# Patient Record
Sex: MALE | Race: AMERICAN INDIAN OR ALASKA NATIVE
[De-identification: names, ages, dates, MRNs, and addresses within clinical notes are randomized per-mention and may not be internally consistent; named-entity substitution may affect disease eponyms.]

---

## 2020-01-02 ENCOUNTER — HOSPITAL ENCOUNTER (EMERGENCY)
Dept: HOSPITAL 5 - ED | Age: 30
Discharge: HOME | End: 2020-01-02
Payer: SELF-PAY

## 2020-01-02 VITALS — DIASTOLIC BLOOD PRESSURE: 66 MMHG | SYSTOLIC BLOOD PRESSURE: 104 MMHG

## 2020-01-02 DIAGNOSIS — R10.31: Primary | ICD-10-CM

## 2020-01-02 DIAGNOSIS — F12.188: ICD-10-CM

## 2020-01-02 DIAGNOSIS — R10.33: ICD-10-CM

## 2020-01-02 LAB
ALBUMIN SERPL-MCNC: 5 G/DL (ref 3.9–5)
ALT SERPL-CCNC: 32 UNITS/L (ref 7–56)
BACTERIA #/AREA URNS HPF: (no result) /HPF
BASOPHILS # (AUTO): 0.1 K/MM3 (ref 0–0.1)
BASOPHILS NFR BLD AUTO: 0.5 % (ref 0–1.8)
BILIRUB UR QL STRIP: (no result)
BLOOD UR QL VISUAL: (no result)
BUN SERPL-MCNC: 32 MG/DL (ref 9–20)
BUN/CREAT SERPL: 23 %
CALCIUM SERPL-MCNC: 11.8 MG/DL (ref 8.4–10.2)
EOSINOPHIL # BLD AUTO: 0 K/MM3 (ref 0–0.4)
EOSINOPHIL NFR BLD AUTO: 0 % (ref 0–4.3)
HCT VFR BLD CALC: 50.2 % (ref 35.5–45.6)
HEMOLYSIS INDEX: 12
HGB BLD-MCNC: 17.2 GM/DL (ref 11.8–15.2)
LYMPHOCYTES # BLD AUTO: 2.1 K/MM3 (ref 1.2–5.4)
LYMPHOCYTES NFR BLD AUTO: 16.9 % (ref 13.4–35)
MCHC RBC AUTO-ENTMCNC: 34 % (ref 32–34)
MCV RBC AUTO: 95 FL (ref 84–94)
MONOCYTES # (AUTO): 1.5 K/MM3 (ref 0–0.8)
MONOCYTES % (AUTO): 12.4 % (ref 0–7.3)
MUCOUS THREADS #/AREA URNS HPF: (no result) /HPF
PH UR STRIP: 5 [PH] (ref 5–7)
PLATELET # BLD: 234 K/MM3 (ref 140–440)
RBC # BLD AUTO: 5.3 M/MM3 (ref 3.65–5.03)
RBC #/AREA URNS HPF: 81 /HPF (ref 0–6)
UROBILINOGEN UR-MCNC: < 2 MG/DL (ref ?–2)
WBC #/AREA URNS HPF: 29 /HPF (ref 0–6)

## 2020-01-02 PROCEDURE — 81001 URINALYSIS AUTO W/SCOPE: CPT

## 2020-01-02 PROCEDURE — 85025 COMPLETE CBC W/AUTO DIFF WBC: CPT

## 2020-01-02 PROCEDURE — 74177 CT ABD & PELVIS W/CONTRAST: CPT

## 2020-01-02 PROCEDURE — 36415 COLL VENOUS BLD VENIPUNCTURE: CPT

## 2020-01-02 PROCEDURE — 87086 URINE CULTURE/COLONY COUNT: CPT

## 2020-01-02 PROCEDURE — 96376 TX/PRO/DX INJ SAME DRUG ADON: CPT

## 2020-01-02 PROCEDURE — 99284 EMERGENCY DEPT VISIT MOD MDM: CPT

## 2020-01-02 PROCEDURE — 96375 TX/PRO/DX INJ NEW DRUG ADDON: CPT

## 2020-01-02 PROCEDURE — 80053 COMPREHEN METABOLIC PANEL: CPT

## 2020-01-02 PROCEDURE — 96365 THER/PROPH/DIAG IV INF INIT: CPT

## 2020-01-02 NOTE — EMERGENCY DEPARTMENT REPORT
<DARREN COTTRELL - Last Filed: 20 07:08>





ED Abdominal Pain HPI





- General


Chief Complaint: Abdominal Pain


Stated Complaint: ABD PAIN


Source: patient


Mode of arrival: Ambulatory


Limitations: No Limitations





- History of Present Illness


Initial Comments: 





Patient is a 29-year-old  male with no past medical history who 

presents to the ED with acute onset persistent severe abdominal pain in the 

right lower quadrant area radiating to the periumbilical area with intermittent 

nausea and vomiting for the last 3 days, worse in the last 12 hours.  Patient 

states that the pain is constant and sharp and persistent and that he is unable 

to sleep or sit still because of severe pain.  Patient denies fever, chills, 

dizziness, syncope, testicle pain, dysuria, urinary frequency and urgency, 

headache, chest pain or shortness of breath, hematuria or headache.


MD Complaint: abdominal pain, other (nausea and vomiting)


-: Sudden, days(s) (3)


Location: periumbilical, RLQ


Radiation: RLQ


Severity: severe


Severity scale (0 -10): 10


Quality: cramping, aching, sharp


Consistency: constant


Improves With: nothing


Worsens With: nothing


Associated Symptoms: denies other symptoms, nausea, vomiting.  denies: diarrhea,

fever, chills, constipation, dysuria, hematemesis, hematochezia, melena, 

hematuria, anorexia, syncope





- Related Data


                                  Previous Rx's











 Medication  Instructions  Recorded  Last Taken  Type


 


Ondansetron [Zofran Odt] 4 mg PO Q8HR PRN #12 tab.rapdis 20 Unknown Rx











                                    Allergies











Allergy/AdvReac Type Severity Reaction Status Date / Time


 


No Known Allergies Allergy   Unverified 20 00:54














ED Review of Systems


Constitutional: denies: chills, fever


Eyes: denies: eye pain, eye discharge, vision change


ENT: denies: ear pain, throat pain


Respiratory: denies: cough, shortness of breath, wheezing


Cardiovascular: denies: chest pain, palpitations


Endocrine: no symptoms reported


Gastrointestinal: abdominal pain, nausea, vomiting.  denies: diarrhea


Genitourinary: denies: urgency, dysuria


Musculoskeletal: denies: back pain, joint swelling, arthralgia


Skin: denies: rash, lesions


Neurological: denies: headache, weakness, paresthesias


Psychiatric: denies: anxiety, depression


Hematological/Lymphatic: denies: easy bleeding, easy bruising





ED Past Medical Hx





- Past Medical History


Previous Medical History?: No





- Surgical History


Past Surgical History?: No





- Social History


Smoking Status: Never Smoker


Substance Use Type: Alcohol, Marijuana





- Medications


Home Medications: 


                                Home Medications











 Medication  Instructions  Recorded  Confirmed  Last Taken  Type


 


Ondansetron [Zofran Odt] 4 mg PO Q8HR PRN #12 tab.rapdis 20  Unknown Rx














ED Physical Exam





- General


Limitations: No Limitations


General appearance: alert, in no apparent distress





- Head


Head exam: Present: atraumatic, normocephalic, normal inspection





- Eye


Eye exam: Present: normal appearance, PERRL, EOMI


Pupils: Present: normal accommodation





- ENT


ENT exam: Present: normal exam, normal orophraynx, mucous membranes moist, TM's 

normal bilaterally, normal external ear exam





- Neck


Neck exam: Present: normal inspection, full ROM.  Absent: tenderness





- Respiratory


Respiratory exam: Present: normal lung sounds bilaterally.  Absent: respiratory 

distress, wheezes, rales, rhonchi, chest wall tenderness, accessory muscle use, 

decreased breath sounds





- Cardiovascular


Cardiovascular Exam: Present: normal rhythm, tachycardia, normal heart sounds.  

Absent: systolic murmur, diastolic murmur, rubs, gallop





- GI/Abdominal


GI/Abdominal exam: Present: soft, tenderness (palpable severe right lower 

quadrant and periumbilical rebound tenderness with guarding), guarding, rebound,

normal bowel sounds.  Absent: rigid





- Extremities Exam


Extremities exam: Present: normal inspection, full ROM, normal capillary refill





- Back Exam


Back exam: Present: normal inspection, full ROM.  Absent: tenderness, CVA 

tenderness (R), CVA tenderness (L), muscle spasm, paraspinal tenderness, 

vertebral tenderness





- Neurological Exam


Neurological exam: Present: alert, oriented X3, CN II-XII intact, normal gait, 

reflexes normal





- Psychiatric


Psychiatric exam: Present: normal affect, normal mood, anxious





- Skin


Skin exam: Present: warm, dry, intact, normal color.  Absent: rash





ED Medical Decision Making





- Lab Data


Result diagrams: 


                                 20 01:01





                                 20 01:01





- Radiology Data


Radiology results: report reviewed, image reviewed





Findings


[facility]


[address] [place] [address]





Cat Scan Report


Signed





Patient: NABEEL HUSSEIN MR#: [id number]


0


: 1990 Acct:[id number]





Age/Sex: 29 / M ADM Date: 20





Loc: ED


Attending Dr:








Ordering Physician: INÉS VIDAL


Date of Service: 20


Procedure(s): CT abdomen pelvis w con


Accession Number(s): [id number]





cc: INÉS VIDAL








CT abdomen pelvis w con





INDICATION:


Severe RLQ Pain.





TECHNIQUE:


All CT scans at this location are performed using CT dose reduction for ALARA by

means of automated


exposure control.





COMPARISON:


None available.





FINDINGS:


There is extensive artifact from patient motion, obscuring considerable detail.





Liver and gallbladder are grossly negative. Spleen is not well seen. Pancreas is

also grossly


negative, and abdominal aorta is normal in size.





Bowel in the midabdomen is completely obscured.





Pelvis





No obvious free fluid or inflammatory change. Appendix cannot be identified, 

partly due to


extensive artifact and partly due to the patient's lack of intra-abdominal fat.





IMPRESSION:


1. Suboptimal exam because of respiratory/peristalsis motion artifact, as well 

as the patient's


total lack of intra-abdominal fat.


2. I see no gross abnormalities, but the appendix is not identified.





Signer Name: Eduardo Licona MD


Signed: 2020 4:46 AM


Workstation Name: [id number]








Transcribed By: TM


Dictated By: Eduardo Licona MD


Electronically Authenticated By: Eduardo Licona MD


Signed Date/Time: 20











DD/DT: 20





- Medical Decision Making





This is a 29-year-old  male with no past medical history present

ed to the ED with severe right lower quadrant and.  Medical Dumble pain with 

nausea and vomiting.  In the ED, patient is alert and oriented 3, anxious, 

tachycardic, unable to lay still for physical exam adequately because of 

persistent movement in the examination bed but generally the physical exam shows

palpable right lower quadrant and.  Medical rebound tenderness with guarding.  

Lab test results show acute leukocytosis of 12,200, hypercalcemia of 11.8, total

bilirubin of 1.4 and hyperglycemia of 1 39 mg/dL.  Urinalysis shows significant 

urinary tract infection.  Initial abdomen pelvis CT scanning with IV contrast 

was suboptimal because of the patient's persistent movement during the exam.  

Therefore report was inconclusive as the appendix could not be visualized 

because of the patient's movements during the exam and artifacts seen during the

imaging.  These findings were discussed with the ED attending physician Dr. Srinivas Reyes were advised that the patient be medicated for pain and abdomen 

pelvis CT scan with oral contrast repeated to characterize the etiology of the 

patient's abdominal pain.  Patient was therefore medicated again with 

medications for pain and antiemetics and oral contrast given to the patient.  

Abdomen pelvis CT scan with oral contrast was repeated.  At the time of shift 

change at 0700 hrs.  The abdomen pelvis CT scan with oral contrast report was 

pending.  The patient Was therefore transferred to Amna Calvillo PA-C at 

shift change.  Ms. Karli HUBBARD shall review the imaging report, reevaluate 

the patient and disposition the patient accordingly.





- Differential Diagnosis


appendicitis; perforated Viscous; UTI; Kidney stones; pancreatitis; Colitis





ED Disposition


Clinical Impression: 


 Abdominal pain in male, Cannabis hyperemesis syndrome concurrent with and due 

to cannabis abuse





Disposition: DC-01 TO HOME OR SELFCARE


Is pt being admited?: No


Does the pt Need Aspirin: No


Condition: Stable


Instructions:  Abdominal Pain (ED)


Additional Instructions: 


CT scan of the abdomen was negative for any acute findings.  Please continue 

with Zofran as needed for nausea and vomiting.  Follow-up with 

gastroenterologist on have listed there information below for your convenience.


Prescriptions: 


Ondansetron [Zofran Odt] 4 mg PO Q8HR PRN #12 tab.rapdis


 PRN Reason: Nausea And Vomiting


Referrals: 


PRIMARY CARE,MD [Primary Care Provider] - 3-5 Days


[facility] [Provider Group] - 3-5 Days


Forms:  Work/School Release Form(ED)





<PIETER ALEXANDER - Last Filed: 20 10:20>





ED Review of Systems


ROS: 


Stated complaint: ABD PAIN


Other details as noted in HPI








ED Course





                                   Vital Signs











  20





  00:53 04:07 04:37


 


Temperature 98.5 F  


 


Pulse Rate 127 H  


 


Respiratory 20 20 22





Rate   


 


Blood Pressure 147/114  


 


O2 Sat by Pulse 98  





Oximetry   














  20





  06:14


 


Temperature 


 


Pulse Rate 


 


Respiratory 20





Rate 


 


Blood Pressure 


 


O2 Sat by Pulse 





Oximetry 














ED Medical Decision Making





- Lab Data


Result diagrams: 


                                 20 01:01





                                 20 01:01





- Radiology Data


Patient: NABEEL HUSSEIN MR#: [id number]


0


: 1990 Acct:[id number]





Age/Sex: 29 / M ADM Date: 20





Loc: ED


Attending Dr:








Ordering Physician: INÉS VIDAL


Date of Service: 20


Procedure(s): CT abdomen pelvis w con


Accession Number(s): [id number]





cc: INÉS VIDAL








CT ABDOMEN AND PELVIS WITH CONTRAST





HISTORY: pain, scanned twice previous scan motion. Severe right lower quadrant 

pain





COMPARISON: Earlier today at 0419 hours





TECHNIQUE: Helical CT images of the abdomen and pelvis were obtained following 

administration of


intravenous contrast. Oral contrast was also administered. Sagittal and coronal 

reformatted images


were reviewed. All CT scans at this location are performed using CT dose 

reduction for ALARA by


means of automated exposure control.








FINDINGS:





Abdomen/pelvis: This patient was rescanned secondary to excessive motion 

artifact on the initial


scan. There is residual IV contrast in the renal collecting systems and bladder 

but inadequate


vascular enhancement. Oral contrast is present throughout most of the GI system.

The appendix is


identified in the right lower quadrant just inferior to the cecum. No evidence 

for acute


appendicitis. The remaining bowel loops are unremarkable. No evidence for 

obstruction or focal


inflammation.





The liver, biliary system, pancreas, spleen, kidneys, adrenal glands, aorta and 

bladder are


unremarkable.





No evidence for free fluid, free air or inflammatory changes.





Lungs/bones: The lung bases are clear. Heart size is normal. Normal bony 

structures.








IMPRESSION:


Unremarkable examination. No acute abdominal process is identified.





Signer Name: Tom Lacey Jr, MD


Signed: 2020 8:29 AM


Workstation Name: [id number]








Transcribed By: TTR


Dictated By: TOM LACEY JR, MD


Electronically Authenticated By: TOM LACEY JR, MD


Signed Date/Time: 20











DD/DT: 20


TD/TT:





- Medical Decision Making


Spoke with patient regarding CT study which was negative for any acute findings.

 When asked this patient smoke marijuana he agrees that he smokes a lot.  

Discussed the patient that he most likely has cannabis hyperemesis syndrome.  

Discussed the patient has he needs to stop smoking cannabis we will continue to 

have these problems.  Patient verbalized understanding.


Critical care attestation.: 


If time is entered above; I have spent that time in minutes in the direct care 

of this critically ill patient, excluding procedure time.








ED Disposition


Is pt being admited?: No


Does the pt Need Aspirin: No

## 2020-01-02 NOTE — CAT SCAN REPORT
CT ABDOMEN AND PELVIS WITH CONTRAST



HISTORY: pain, scanned twice   previous scan motion.  Severe right lower quadrant pain



COMPARISON: Earlier today at 0419 hours



TECHNIQUE: Helical CT images of the abdomen and pelvis were obtained following administration of intr
avenous contrast. Oral contrast was also administered. Sagittal and coronal reformatted images were r
eviewed. All CT scans at this location are performed using CT dose reduction for ALARA by means of au
tomated exposure control. 





FINDINGS:



Abdomen/pelvis:  This patient was rescanned secondary to excessive motion artifact on the initial sca
n. There is residual IV contrast in the renal collecting systems and bladder but inadequate vascular 
enhancement. Oral contrast is present throughout most of the GI system. The appendix is identified in
 the right lower quadrant just inferior to the cecum. No evidence for acute appendicitis. The remaini
ng bowel loops are unremarkable. No evidence for obstruction or focal inflammation.



The liver, biliary system, pancreas, spleen, kidneys, adrenal glands, aorta and bladder are unremarka
ble.



No evidence for free fluid, free air or inflammatory changes.



Lungs/bones:  The lung bases are clear. Heart size is normal. Normal bony structures.





IMPRESSION:

Unremarkable examination. No acute abdominal process is identified.



Signer Name: Tom Lacey Jr, MD 

Signed: 1/2/2020 8:29 AM

 Workstation Name: [id number]